# Patient Record
Sex: FEMALE | Race: WHITE | NOT HISPANIC OR LATINO | URBAN - METROPOLITAN AREA
[De-identification: names, ages, dates, MRNs, and addresses within clinical notes are randomized per-mention and may not be internally consistent; named-entity substitution may affect disease eponyms.]

---

## 2017-02-17 ENCOUNTER — OUTPATIENT (OUTPATIENT)
Dept: OUTPATIENT SERVICES | Facility: HOSPITAL | Age: 67
LOS: 1 days | Discharge: HOME | End: 2017-02-17

## 2017-02-17 DIAGNOSIS — C50.919 MALIGNANT NEOPLASM OF UNSPECIFIED SITE OF UNSPECIFIED FEMALE BREAST: ICD-10-CM

## 2017-02-20 ENCOUNTER — RESULT REVIEW (OUTPATIENT)
Age: 67
End: 2017-02-20

## 2017-02-21 ENCOUNTER — APPOINTMENT (OUTPATIENT)
Dept: HEMATOLOGY ONCOLOGY | Facility: CLINIC | Age: 67
End: 2017-02-21

## 2017-02-21 VITALS
DIASTOLIC BLOOD PRESSURE: 90 MMHG | HEART RATE: 99 BPM | BODY MASS INDEX: 25.06 KG/M2 | WEIGHT: 158.73 LBS | TEMPERATURE: 97.7 F | SYSTOLIC BLOOD PRESSURE: 150 MMHG | RESPIRATION RATE: 16 BRPM | OXYGEN SATURATION: 98 %

## 2017-02-21 DIAGNOSIS — F41.9 ANXIETY DISORDER, UNSPECIFIED: ICD-10-CM

## 2017-02-21 LAB
BASOPHILS # BLD AUTO: 0 K/UL — SIGNIFICANT CHANGE UP (ref 0–0.2)
BASOPHILS NFR BLD AUTO: 0.7 % — SIGNIFICANT CHANGE UP (ref 0–2)
EOSINOPHIL # BLD AUTO: 0.2 K/UL — SIGNIFICANT CHANGE UP (ref 0–0.5)
EOSINOPHIL NFR BLD AUTO: 3.1 % — SIGNIFICANT CHANGE UP (ref 0–6)
HCT VFR BLD CALC: 43.9 % — SIGNIFICANT CHANGE UP (ref 34.5–45)
HGB BLD-MCNC: 14.7 G/DL — SIGNIFICANT CHANGE UP (ref 11.5–15.5)
LYMPHOCYTES # BLD AUTO: 2 K/UL — SIGNIFICANT CHANGE UP (ref 1–3.3)
LYMPHOCYTES # BLD AUTO: 29.2 % — SIGNIFICANT CHANGE UP (ref 13–44)
MCHC RBC-ENTMCNC: 30.9 PG — SIGNIFICANT CHANGE UP (ref 27–34)
MCHC RBC-ENTMCNC: 33.4 G/DL — SIGNIFICANT CHANGE UP (ref 32–36)
MCV RBC AUTO: 92.7 FL — SIGNIFICANT CHANGE UP (ref 80–100)
MONOCYTES # BLD AUTO: 0.5 K/UL — SIGNIFICANT CHANGE UP (ref 0–0.9)
MONOCYTES NFR BLD AUTO: 7.4 % — SIGNIFICANT CHANGE UP (ref 2–14)
NEUTROPHILS # BLD AUTO: 4 K/UL — SIGNIFICANT CHANGE UP (ref 1.8–7.4)
NEUTROPHILS NFR BLD AUTO: 59.6 % — SIGNIFICANT CHANGE UP (ref 43–77)
PLATELET # BLD AUTO: 169 K/UL — SIGNIFICANT CHANGE UP (ref 150–400)
RBC # BLD: 4.74 M/UL — SIGNIFICANT CHANGE UP (ref 3.8–5.2)
RBC # FLD: 12.3 % — SIGNIFICANT CHANGE UP (ref 10.3–14.5)
WBC # BLD: 6.8 K/UL — SIGNIFICANT CHANGE UP (ref 3.8–10.5)
WBC # FLD AUTO: 6.8 K/UL — SIGNIFICANT CHANGE UP (ref 3.8–10.5)

## 2017-02-23 LAB — 25(OH)D3 SERPL-MCNC: 17.4 NG/ML

## 2017-04-03 ENCOUNTER — OTHER (OUTPATIENT)
Age: 67
End: 2017-04-03

## 2017-05-03 ENCOUNTER — RX RENEWAL (OUTPATIENT)
Age: 67
End: 2017-05-03

## 2017-05-08 ENCOUNTER — FORM ENCOUNTER (OUTPATIENT)
Age: 67
End: 2017-05-08

## 2017-05-09 ENCOUNTER — OUTPATIENT (OUTPATIENT)
Dept: OUTPATIENT SERVICES | Facility: HOSPITAL | Age: 67
LOS: 1 days | End: 2017-05-09
Payer: MEDICARE

## 2017-05-09 ENCOUNTER — APPOINTMENT (OUTPATIENT)
Dept: ULTRASOUND IMAGING | Facility: IMAGING CENTER | Age: 67
End: 2017-05-09

## 2017-05-09 ENCOUNTER — APPOINTMENT (OUTPATIENT)
Dept: MAMMOGRAPHY | Facility: IMAGING CENTER | Age: 67
End: 2017-05-09

## 2017-05-09 DIAGNOSIS — C50.919 MALIGNANT NEOPLASM OF UNSPECIFIED SITE OF UNSPECIFIED FEMALE BREAST: ICD-10-CM

## 2017-05-09 PROCEDURE — 76641 ULTRASOUND BREAST COMPLETE: CPT

## 2017-05-09 PROCEDURE — 77065 DX MAMMO INCL CAD UNI: CPT

## 2017-05-09 PROCEDURE — G0279: CPT

## 2017-05-12 ENCOUNTER — FORM ENCOUNTER (OUTPATIENT)
Age: 67
End: 2017-05-12

## 2017-05-13 ENCOUNTER — OUTPATIENT (OUTPATIENT)
Dept: OUTPATIENT SERVICES | Facility: HOSPITAL | Age: 67
LOS: 1 days | End: 2017-05-13
Payer: MEDICARE

## 2017-05-13 ENCOUNTER — RESULT REVIEW (OUTPATIENT)
Age: 67
End: 2017-05-13

## 2017-05-13 ENCOUNTER — APPOINTMENT (OUTPATIENT)
Dept: ULTRASOUND IMAGING | Facility: IMAGING CENTER | Age: 67
End: 2017-05-13

## 2017-05-13 DIAGNOSIS — Z00.8 ENCOUNTER FOR OTHER GENERAL EXAMINATION: ICD-10-CM

## 2017-05-13 PROCEDURE — 77065 DX MAMMO INCL CAD UNI: CPT

## 2017-05-13 PROCEDURE — 88305 TISSUE EXAM BY PATHOLOGIST: CPT

## 2017-05-13 PROCEDURE — 19083 BX BREAST 1ST LESION US IMAG: CPT

## 2017-05-15 DIAGNOSIS — N63 UNSPECIFIED LUMP IN BREAST: ICD-10-CM

## 2017-05-15 DIAGNOSIS — Z85.3 PERSONAL HISTORY OF MALIGNANT NEOPLASM OF BREAST: ICD-10-CM

## 2017-05-15 DIAGNOSIS — R92.1 MAMMOGRAPHIC CALCIFICATION FOUND ON DIAGNOSTIC IMAGING OF BREAST: ICD-10-CM

## 2017-08-23 ENCOUNTER — OUTPATIENT (OUTPATIENT)
Dept: OUTPATIENT SERVICES | Facility: HOSPITAL | Age: 67
LOS: 1 days | Discharge: HOME | End: 2017-08-23

## 2017-08-23 DIAGNOSIS — C50.919 MALIGNANT NEOPLASM OF UNSPECIFIED SITE OF UNSPECIFIED FEMALE BREAST: ICD-10-CM

## 2017-08-29 ENCOUNTER — APPOINTMENT (OUTPATIENT)
Dept: HEMATOLOGY ONCOLOGY | Facility: CLINIC | Age: 67
End: 2017-08-29
Payer: MEDICARE

## 2017-08-29 VITALS
OXYGEN SATURATION: 99 % | DIASTOLIC BLOOD PRESSURE: 97 MMHG | HEIGHT: 65.75 IN | HEART RATE: 95 BPM | TEMPERATURE: 98.6 F | WEIGHT: 163.14 LBS | RESPIRATION RATE: 16 BRPM | SYSTOLIC BLOOD PRESSURE: 152 MMHG | BODY MASS INDEX: 26.53 KG/M2

## 2017-08-29 PROCEDURE — 99214 OFFICE O/P EST MOD 30 MIN: CPT

## 2017-08-29 RX ORDER — CALCIPOTRIENE AND BETAMETHASONE DIPROPIONATE 50; .5 UG/G; MG/G
0.005-0.064 AEROSOL, FOAM TOPICAL
Qty: 60 | Refills: 0 | Status: DISCONTINUED | COMMUNITY
Start: 2017-02-16

## 2017-11-09 ENCOUNTER — RX RENEWAL (OUTPATIENT)
Age: 67
End: 2017-11-09

## 2017-12-28 ENCOUNTER — OTHER (OUTPATIENT)
Age: 67
End: 2017-12-28

## 2018-01-02 ENCOUNTER — APPOINTMENT (OUTPATIENT)
Dept: ULTRASOUND IMAGING | Facility: IMAGING CENTER | Age: 68
End: 2018-01-02

## 2018-01-24 ENCOUNTER — RX RENEWAL (OUTPATIENT)
Age: 68
End: 2018-01-24

## 2018-01-26 ENCOUNTER — APPOINTMENT (OUTPATIENT)
Dept: ULTRASOUND IMAGING | Facility: IMAGING CENTER | Age: 68
End: 2018-01-26
Payer: MEDICARE

## 2018-01-26 ENCOUNTER — OUTPATIENT (OUTPATIENT)
Dept: OUTPATIENT SERVICES | Facility: HOSPITAL | Age: 68
LOS: 1 days | End: 2018-01-26
Payer: MEDICARE

## 2018-01-26 DIAGNOSIS — C50.919 MALIGNANT NEOPLASM OF UNSPECIFIED SITE OF UNSPECIFIED FEMALE BREAST: ICD-10-CM

## 2018-01-26 PROCEDURE — 76642 ULTRASOUND BREAST LIMITED: CPT | Mod: 26,RT

## 2018-01-26 PROCEDURE — 76642 ULTRASOUND BREAST LIMITED: CPT

## 2018-02-14 ENCOUNTER — OUTPATIENT (OUTPATIENT)
Dept: OUTPATIENT SERVICES | Facility: HOSPITAL | Age: 68
LOS: 1 days | Discharge: HOME | End: 2018-02-14

## 2018-02-14 DIAGNOSIS — C50.919 MALIGNANT NEOPLASM OF UNSPECIFIED SITE OF UNSPECIFIED FEMALE BREAST: ICD-10-CM

## 2018-02-20 ENCOUNTER — APPOINTMENT (OUTPATIENT)
Dept: HEMATOLOGY ONCOLOGY | Facility: CLINIC | Age: 68
End: 2018-02-20
Payer: MEDICARE

## 2018-02-20 VITALS
SYSTOLIC BLOOD PRESSURE: 157 MMHG | HEART RATE: 110 BPM | DIASTOLIC BLOOD PRESSURE: 92 MMHG | RESPIRATION RATE: 16 BRPM | OXYGEN SATURATION: 98 % | BODY MASS INDEX: 26.96 KG/M2 | WEIGHT: 165.79 LBS | TEMPERATURE: 99.2 F

## 2018-02-20 DIAGNOSIS — M81.0 AGE-RELATED OSTEOPOROSIS W/OUT CURRENT PATHOLOGICAL FRACTURE: ICD-10-CM

## 2018-02-20 PROCEDURE — 99214 OFFICE O/P EST MOD 30 MIN: CPT

## 2018-02-20 RX ORDER — IXEKIZUMAB 80 MG/ML
80 INJECTION, SOLUTION SUBCUTANEOUS
Refills: 0 | Status: ACTIVE | COMMUNITY

## 2018-05-09 ENCOUNTER — RX RENEWAL (OUTPATIENT)
Age: 68
End: 2018-05-09

## 2018-05-30 ENCOUNTER — RX RENEWAL (OUTPATIENT)
Age: 68
End: 2018-05-30

## 2018-07-26 ENCOUNTER — OUTPATIENT (OUTPATIENT)
Dept: OUTPATIENT SERVICES | Facility: HOSPITAL | Age: 68
LOS: 1 days | Discharge: HOME | End: 2018-07-26

## 2018-07-26 DIAGNOSIS — C50.919 MALIGNANT NEOPLASM OF UNSPECIFIED SITE OF UNSPECIFIED FEMALE BREAST: ICD-10-CM

## 2018-08-03 ENCOUNTER — APPOINTMENT (OUTPATIENT)
Dept: HEMATOLOGY ONCOLOGY | Facility: CLINIC | Age: 68
End: 2018-08-03
Payer: MEDICARE

## 2018-08-03 VITALS
SYSTOLIC BLOOD PRESSURE: 149 MMHG | OXYGEN SATURATION: 99 % | HEART RATE: 98 BPM | RESPIRATION RATE: 16 BRPM | BODY MASS INDEX: 26.18 KG/M2 | TEMPERATURE: 98 F | WEIGHT: 161 LBS | DIASTOLIC BLOOD PRESSURE: 87 MMHG

## 2018-08-03 DIAGNOSIS — C50.919 MALIGNANT NEOPLASM OF UNSPECIFIED SITE OF UNSPECIFIED FEMALE BREAST: ICD-10-CM

## 2018-08-03 PROCEDURE — 99214 OFFICE O/P EST MOD 30 MIN: CPT

## 2018-10-04 ENCOUNTER — OTHER (OUTPATIENT)
Age: 68
End: 2018-10-04

## 2018-10-07 ENCOUNTER — FORM ENCOUNTER (OUTPATIENT)
Age: 68
End: 2018-10-07

## 2018-10-08 ENCOUNTER — APPOINTMENT (OUTPATIENT)
Dept: ULTRASOUND IMAGING | Facility: IMAGING CENTER | Age: 68
End: 2018-10-08
Payer: MEDICARE

## 2018-10-08 ENCOUNTER — OUTPATIENT (OUTPATIENT)
Dept: OUTPATIENT SERVICES | Facility: HOSPITAL | Age: 68
LOS: 1 days | End: 2018-10-08
Payer: MEDICARE

## 2018-10-08 ENCOUNTER — APPOINTMENT (OUTPATIENT)
Dept: MAMMOGRAPHY | Facility: IMAGING CENTER | Age: 68
End: 2018-10-08
Payer: MEDICARE

## 2018-10-08 DIAGNOSIS — C50.912 MALIGNANT NEOPLASM OF UNSPECIFIED SITE OF LEFT FEMALE BREAST: ICD-10-CM

## 2018-10-08 PROCEDURE — 77063 BREAST TOMOSYNTHESIS BI: CPT

## 2018-10-08 PROCEDURE — 77063 BREAST TOMOSYNTHESIS BI: CPT | Mod: 26,52

## 2018-10-08 PROCEDURE — 77067 SCR MAMMO BI INCL CAD: CPT

## 2018-10-08 PROCEDURE — 76641 ULTRASOUND BREAST COMPLETE: CPT | Mod: 26,RT

## 2018-10-08 PROCEDURE — 76641 ULTRASOUND BREAST COMPLETE: CPT

## 2018-10-08 PROCEDURE — 77067 SCR MAMMO BI INCL CAD: CPT | Mod: 26,RT,52

## 2018-11-28 ENCOUNTER — RX RENEWAL (OUTPATIENT)
Age: 68
End: 2018-11-28

## 2019-01-11 ENCOUNTER — OTHER (OUTPATIENT)
Age: 69
End: 2019-01-11

## 2019-02-01 ENCOUNTER — OUTPATIENT (OUTPATIENT)
Dept: OUTPATIENT SERVICES | Facility: HOSPITAL | Age: 69
LOS: 1 days | Discharge: HOME | End: 2019-02-01

## 2019-02-01 DIAGNOSIS — C50.919 MALIGNANT NEOPLASM OF UNSPECIFIED SITE OF UNSPECIFIED FEMALE BREAST: ICD-10-CM

## 2019-02-25 ENCOUNTER — FORM ENCOUNTER (OUTPATIENT)
Age: 69
End: 2019-02-25

## 2019-02-25 ENCOUNTER — OUTPATIENT (OUTPATIENT)
Dept: OUTPATIENT SERVICES | Facility: HOSPITAL | Age: 69
LOS: 1 days | Discharge: HOME | End: 2019-02-25

## 2019-02-25 DIAGNOSIS — C50.919 MALIGNANT NEOPLASM OF UNSPECIFIED SITE OF UNSPECIFIED FEMALE BREAST: ICD-10-CM

## 2019-02-26 ENCOUNTER — OTHER (OUTPATIENT)
Age: 69
End: 2019-02-26

## 2019-02-26 ENCOUNTER — OUTPATIENT (OUTPATIENT)
Dept: OUTPATIENT SERVICES | Facility: HOSPITAL | Age: 69
LOS: 1 days | End: 2019-02-26
Payer: MEDICARE

## 2019-02-26 ENCOUNTER — APPOINTMENT (OUTPATIENT)
Dept: MRI IMAGING | Facility: IMAGING CENTER | Age: 69
End: 2019-02-26
Payer: MEDICARE

## 2019-02-26 DIAGNOSIS — C50.912 MALIGNANT NEOPLASM OF UNSPECIFIED SITE OF LEFT FEMALE BREAST: ICD-10-CM

## 2019-02-26 PROCEDURE — 77049 MRI BREAST C-+ W/CAD BI: CPT | Mod: 26

## 2019-02-26 PROCEDURE — C8937: CPT

## 2019-02-26 PROCEDURE — A9585: CPT

## 2019-02-26 PROCEDURE — C8908: CPT

## 2019-03-05 ENCOUNTER — APPOINTMENT (OUTPATIENT)
Dept: HEMATOLOGY ONCOLOGY | Facility: CLINIC | Age: 69
End: 2019-03-05
Payer: MEDICARE

## 2019-03-05 VITALS
SYSTOLIC BLOOD PRESSURE: 161 MMHG | RESPIRATION RATE: 16 BRPM | WEIGHT: 169.51 LBS | TEMPERATURE: 98.2 F | OXYGEN SATURATION: 96 % | HEART RATE: 111 BPM | BODY MASS INDEX: 27.57 KG/M2 | DIASTOLIC BLOOD PRESSURE: 93 MMHG

## 2019-03-05 DIAGNOSIS — C50.912 MALIGNANT NEOPLASM OF UNSPECIFIED SITE OF LEFT FEMALE BREAST: ICD-10-CM

## 2019-03-05 PROCEDURE — 99214 OFFICE O/P EST MOD 30 MIN: CPT

## 2019-03-05 RX ORDER — TRIAMCINOLONE ACETONIDE 1 MG/G
0.1 CREAM TOPICAL
Qty: 80 | Refills: 0 | Status: DISCONTINUED | COMMUNITY
Start: 2017-01-26 | End: 2019-03-05

## 2019-03-07 NOTE — PHYSICAL EXAM
[Fully active, able to carry on all pre-disease performance without restriction] : Status 0 - Fully active, able to carry on all pre-disease performance without restriction [Normal] : affect appropriate [de-identified] : right w/o masses; left s/p MRM with reconstruction, no masses; B/L ax neg [de-identified] : elbow areas with psoriatic skin lesions ;  less than on prior exam plaque-like lesions with dried, raised firm center c/o psoriasis

## 2019-03-07 NOTE — HISTORY OF PRESENT ILLNESS
[Disease: _____________________] : Disease: [unfilled] [AJCC Stage: ____] : AJCC Stage: [unfilled] [de-identified] : H/O stage IIIA left breast ca s/p neoadjuvant DD AC-T followed by LMRM/AXLND/reconstruction for 2.8 cm residual poorly diff IDC and additional 0.5 and 0.6 cm foci, 0/3 SLN, ER+ SC+ Her 2 adeline neg. Then S/P XRT at [facility] [place] 7/09. Begun on anastrazole 7/09 with significant arthralgias, and switched to exemestane with difficulty with depth perception and then switched to tamoxifen 3/10 to present (with resolution of all side effects).  [de-identified] : Interval History: Patient here for a follow up visit.\par \par She notes a good appetite, stable weight and excellent performance status. 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  She is having difficulty with her psoriasis and ha schanged medications with more relief of sxs in the recent past. \par \par New PCP : Trent Good [phone]\par \par Dexa: 10/2016: WNL in spine and hip but osteoporosis wrist\par Mammo  5/17 with right breast findings with bx neg; ; sched'd for 9/18\par \par \par \par

## 2019-03-15 ENCOUNTER — FORM ENCOUNTER (OUTPATIENT)
Age: 69
End: 2019-03-15

## 2019-03-16 ENCOUNTER — OUTPATIENT (OUTPATIENT)
Dept: OUTPATIENT SERVICES | Facility: HOSPITAL | Age: 69
LOS: 1 days | End: 2019-03-16
Payer: MEDICARE

## 2019-03-16 ENCOUNTER — APPOINTMENT (OUTPATIENT)
Dept: RADIOLOGY | Facility: CLINIC | Age: 69
End: 2019-03-16

## 2019-03-16 DIAGNOSIS — C50.919 MALIGNANT NEOPLASM OF UNSPECIFIED SITE OF UNSPECIFIED FEMALE BREAST: ICD-10-CM

## 2019-03-16 DIAGNOSIS — M81.0 AGE-RELATED OSTEOPOROSIS WITHOUT CURRENT PATHOLOGICAL FRACTURE: ICD-10-CM

## 2019-03-16 PROCEDURE — 77080 DXA BONE DENSITY AXIAL: CPT | Mod: 26

## 2019-03-16 PROCEDURE — 77080 DXA BONE DENSITY AXIAL: CPT

## 2020-02-18 ENCOUNTER — OUTPATIENT (OUTPATIENT)
Dept: OUTPATIENT SERVICES | Facility: HOSPITAL | Age: 70
LOS: 1 days | End: 2020-02-18
Payer: MEDICARE

## 2020-02-18 ENCOUNTER — APPOINTMENT (OUTPATIENT)
Dept: ULTRASOUND IMAGING | Facility: CLINIC | Age: 70
End: 2020-02-18
Payer: MEDICARE

## 2020-02-18 ENCOUNTER — APPOINTMENT (OUTPATIENT)
Dept: MAMMOGRAPHY | Facility: CLINIC | Age: 70
End: 2020-02-18
Payer: MEDICARE

## 2020-02-18 DIAGNOSIS — Z12.31 ENCOUNTER FOR SCREENING MAMMOGRAM FOR MALIGNANT NEOPLASM OF BREAST: ICD-10-CM

## 2020-02-18 DIAGNOSIS — Z00.8 ENCOUNTER FOR OTHER GENERAL EXAMINATION: ICD-10-CM

## 2020-02-18 PROCEDURE — 77063 BREAST TOMOSYNTHESIS BI: CPT | Mod: 26,52

## 2020-02-18 PROCEDURE — 77067 SCR MAMMO BI INCL CAD: CPT

## 2020-02-18 PROCEDURE — 77067 SCR MAMMO BI INCL CAD: CPT | Mod: 26,RT,52

## 2020-02-18 PROCEDURE — 76641 ULTRASOUND BREAST COMPLETE: CPT | Mod: 26,RT

## 2020-02-18 PROCEDURE — 77063 BREAST TOMOSYNTHESIS BI: CPT

## 2020-02-18 PROCEDURE — 76641 ULTRASOUND BREAST COMPLETE: CPT

## 2021-03-03 ENCOUNTER — OUTPATIENT (OUTPATIENT)
Dept: OUTPATIENT SERVICES | Facility: HOSPITAL | Age: 71
LOS: 1 days | End: 2021-03-03
Payer: MEDICARE

## 2021-03-03 ENCOUNTER — APPOINTMENT (OUTPATIENT)
Dept: ULTRASOUND IMAGING | Facility: CLINIC | Age: 71
End: 2021-03-03
Payer: MEDICARE

## 2021-03-03 ENCOUNTER — APPOINTMENT (OUTPATIENT)
Dept: MAMMOGRAPHY | Facility: CLINIC | Age: 71
End: 2021-03-03
Payer: MEDICARE

## 2021-03-03 DIAGNOSIS — Z00.8 ENCOUNTER FOR OTHER GENERAL EXAMINATION: ICD-10-CM

## 2021-03-03 PROCEDURE — 77067 SCR MAMMO BI INCL CAD: CPT | Mod: 26,RT,52

## 2021-03-03 PROCEDURE — 77063 BREAST TOMOSYNTHESIS BI: CPT

## 2021-03-03 PROCEDURE — 76641 ULTRASOUND BREAST COMPLETE: CPT

## 2021-03-03 PROCEDURE — 77063 BREAST TOMOSYNTHESIS BI: CPT | Mod: 26,52

## 2021-03-03 PROCEDURE — 76641 ULTRASOUND BREAST COMPLETE: CPT | Mod: 26,RT

## 2021-03-03 PROCEDURE — 77067 SCR MAMMO BI INCL CAD: CPT

## 2021-03-29 ENCOUNTER — OUTPATIENT (OUTPATIENT)
Dept: OUTPATIENT SERVICES | Facility: HOSPITAL | Age: 71
LOS: 1 days | End: 2021-03-29
Payer: MEDICARE

## 2021-03-29 ENCOUNTER — APPOINTMENT (OUTPATIENT)
Dept: RADIOLOGY | Facility: CLINIC | Age: 71
End: 2021-03-29
Payer: MEDICARE

## 2021-03-29 DIAGNOSIS — Z00.00 ENCOUNTER FOR GENERAL ADULT MEDICAL EXAMINATION WITHOUT ABNORMAL FINDINGS: ICD-10-CM

## 2021-03-29 PROCEDURE — 77080 DXA BONE DENSITY AXIAL: CPT | Mod: 26

## 2021-03-29 PROCEDURE — 77080 DXA BONE DENSITY AXIAL: CPT

## 2022-03-04 ENCOUNTER — APPOINTMENT (OUTPATIENT)
Dept: MAMMOGRAPHY | Facility: CLINIC | Age: 72
End: 2022-03-04
Payer: MEDICARE

## 2022-03-04 ENCOUNTER — OUTPATIENT (OUTPATIENT)
Dept: OUTPATIENT SERVICES | Facility: HOSPITAL | Age: 72
LOS: 1 days | End: 2022-03-04
Payer: MEDICARE

## 2022-03-04 ENCOUNTER — APPOINTMENT (OUTPATIENT)
Dept: ULTRASOUND IMAGING | Facility: CLINIC | Age: 72
End: 2022-03-04
Payer: MEDICARE

## 2022-03-04 DIAGNOSIS — Z00.8 ENCOUNTER FOR OTHER GENERAL EXAMINATION: ICD-10-CM

## 2022-03-04 PROCEDURE — 77067 SCR MAMMO BI INCL CAD: CPT | Mod: 26,RT,52

## 2022-03-04 PROCEDURE — 77063 BREAST TOMOSYNTHESIS BI: CPT | Mod: 26,52

## 2022-03-04 PROCEDURE — 76641 ULTRASOUND BREAST COMPLETE: CPT | Mod: 26,RT

## 2022-03-04 PROCEDURE — 76641 ULTRASOUND BREAST COMPLETE: CPT

## 2022-03-04 PROCEDURE — 77063 BREAST TOMOSYNTHESIS BI: CPT

## 2022-03-04 PROCEDURE — 77067 SCR MAMMO BI INCL CAD: CPT

## 2022-04-04 ENCOUNTER — APPOINTMENT (OUTPATIENT)
Dept: ORTHOPEDIC SURGERY | Facility: CLINIC | Age: 72
End: 2022-04-04
Payer: MEDICARE

## 2022-04-04 VITALS — HEIGHT: 66 IN | WEIGHT: 150 LBS | BODY MASS INDEX: 24.11 KG/M2

## 2022-04-04 DIAGNOSIS — M18.11 UNILATERAL PRIMARY OSTEOARTHRITIS OF FIRST CARPOMETACARPAL JOINT, RIGHT HAND: ICD-10-CM

## 2022-04-04 PROCEDURE — 99213 OFFICE O/P EST LOW 20 MIN: CPT

## 2022-04-04 NOTE — HISTORY OF PRESENT ILLNESS
[6] : 6 [2] : 2 [Dull/Aching] : dull/aching [Sharp] : sharp [FreeTextEntry1] : left hand [de-identified] : none

## 2022-07-14 ENCOUNTER — APPOINTMENT (OUTPATIENT)
Dept: ORTHOPEDIC SURGERY | Facility: CLINIC | Age: 72
End: 2022-07-14

## 2022-07-14 VITALS — HEIGHT: 66 IN | BODY MASS INDEX: 24.11 KG/M2 | WEIGHT: 150 LBS

## 2022-07-14 DIAGNOSIS — R20.0 ANESTHESIA OF SKIN: ICD-10-CM

## 2022-07-14 DIAGNOSIS — L40.9 PSORIASIS, UNSPECIFIED: ICD-10-CM

## 2022-07-14 DIAGNOSIS — M18.11 UNILATERAL PRIMARY OSTEOARTHRITIS OF FIRST CARPOMETACARPAL JOINT, RIGHT HAND: ICD-10-CM

## 2022-07-14 PROCEDURE — 99213 OFFICE O/P EST LOW 20 MIN: CPT

## 2022-07-14 NOTE — PHYSICAL EXAM
[de-identified] : L hand \par Mild swelling\par Finger and thumb stiffness\par Mild tender thumb CMC \par \par

## 2022-07-14 NOTE — ASSESSMENT
[FreeTextEntry1] : Neuro consult- neuropathy \par EMG bilateral UE and LE\par Return after neruo consult

## 2022-07-14 NOTE — HISTORY OF PRESENT ILLNESS
[3] : 3 [2] : 2 [Dull/Aching] : dull/aching [de-identified] : L hand numbness, loss of dexterity  [FreeTextEntry1] : L hand [de-identified] : PT

## 2022-07-21 ENCOUNTER — APPOINTMENT (OUTPATIENT)
Dept: NEUROLOGY | Facility: CLINIC | Age: 72
End: 2022-07-21

## 2022-07-21 DIAGNOSIS — G60.9 HEREDITARY AND IDIOPATHIC NEUROPATHY, UNSPECIFIED: ICD-10-CM

## 2022-07-21 DIAGNOSIS — R20.8 OTHER DISTURBANCES OF SKIN SENSATION: ICD-10-CM

## 2022-07-21 DIAGNOSIS — R20.2 ANESTHESIA OF SKIN: ICD-10-CM

## 2022-07-21 DIAGNOSIS — R20.0 ANESTHESIA OF SKIN: ICD-10-CM

## 2022-07-21 PROCEDURE — 95911 NRV CNDJ TEST 9-10 STUDIES: CPT

## 2022-07-21 PROCEDURE — 95886 MUSC TEST DONE W/N TEST COMP: CPT

## 2022-07-28 ENCOUNTER — APPOINTMENT (OUTPATIENT)
Dept: NEUROLOGY | Facility: CLINIC | Age: 72
End: 2022-07-28

## 2022-07-28 DIAGNOSIS — M54.12 RADICULOPATHY, CERVICAL REGION: ICD-10-CM

## 2022-07-28 DIAGNOSIS — G56.01 CARPAL TUNNEL SYNDROME, RIGHT UPPER LIMB: ICD-10-CM

## 2022-07-28 DIAGNOSIS — R20.2 ANESTHESIA OF SKIN: ICD-10-CM

## 2022-07-28 DIAGNOSIS — R20.0 ANESTHESIA OF SKIN: ICD-10-CM

## 2022-07-28 PROCEDURE — 95912 NRV CNDJ TEST 11-12 STUDIES: CPT

## 2022-07-28 PROCEDURE — 95886 MUSC TEST DONE W/N TEST COMP: CPT

## 2022-08-16 ENCOUNTER — APPOINTMENT (OUTPATIENT)
Dept: ORTHOPEDIC SURGERY | Facility: CLINIC | Age: 72
End: 2022-08-16

## 2022-08-16 VITALS — HEIGHT: 66 IN | WEIGHT: 150 LBS | BODY MASS INDEX: 24.11 KG/M2

## 2022-08-16 DIAGNOSIS — G62.9 POLYNEUROPATHY, UNSPECIFIED: ICD-10-CM

## 2022-08-16 DIAGNOSIS — M18.12 UNILATERAL PRIMARY OSTEOARTHRITIS OF FIRST CARPOMETACARPAL JOINT, LEFT HAND: ICD-10-CM

## 2022-08-16 PROCEDURE — 99213 OFFICE O/P EST LOW 20 MIN: CPT

## 2022-08-17 PROBLEM — M18.12 PRIMARY OSTEOARTHRITIS OF FIRST CARPOMETACARPAL JOINT OF LEFT HAND: Status: ACTIVE | Noted: 2022-04-04

## 2022-08-17 PROBLEM — G62.9 PERIPHERAL POLYNEUROPATHY: Status: ACTIVE | Noted: 2022-07-14

## 2022-08-17 NOTE — PHYSICAL EXAM
[de-identified] : L hand \par Mild swelling\par Finger and thumb stiffness\par Mild tender thumb CMC \par \par

## 2022-08-17 NOTE — HISTORY OF PRESENT ILLNESS
[4] : 4 [3] : 3 [Dull/Aching] : dull/aching [de-identified] : EMG neg for L CTS; could not do cerivcal due to lymph edema [FreeTextEntry1] : L hand [de-identified] : ice,advil

## 2022-10-10 ENCOUNTER — APPOINTMENT (OUTPATIENT)
Dept: ORTHOPEDIC SURGERY | Facility: CLINIC | Age: 72
End: 2022-10-10

## 2022-10-10 VITALS — WEIGHT: 150 LBS | BODY MASS INDEX: 24.11 KG/M2 | HEIGHT: 66 IN

## 2022-10-10 DIAGNOSIS — M77.8 OTHER ENTHESOPATHIES, NOT ELSEWHERE CLASSIFIED: ICD-10-CM

## 2022-10-10 PROCEDURE — J3490M: CUSTOM

## 2022-10-10 PROCEDURE — 20600 DRAIN/INJ JOINT/BURSA W/O US: CPT

## 2022-10-10 PROCEDURE — 99213 OFFICE O/P EST LOW 20 MIN: CPT | Mod: 25

## 2022-10-11 PROBLEM — M77.8 HAND TENDONITIS: Status: ACTIVE | Noted: 2022-04-04

## 2022-10-11 NOTE — HISTORY OF PRESENT ILLNESS
[3] : 3 [2] : 2 [Dull/Aching] : dull/aching [de-identified] : L 5th MCP pain and swelling\par \par  [FreeTextEntry1] : Left hand [FreeTextEntry5] : pain is the same\par  [de-identified] : No

## 2023-04-11 ENCOUNTER — APPOINTMENT (OUTPATIENT)
Dept: MAMMOGRAPHY | Facility: CLINIC | Age: 73
End: 2023-04-11
Payer: MEDICARE

## 2023-04-11 ENCOUNTER — APPOINTMENT (OUTPATIENT)
Dept: ULTRASOUND IMAGING | Facility: CLINIC | Age: 73
End: 2023-04-11
Payer: MEDICARE

## 2023-04-11 ENCOUNTER — OUTPATIENT (OUTPATIENT)
Dept: OUTPATIENT SERVICES | Facility: HOSPITAL | Age: 73
LOS: 1 days | End: 2023-04-11
Payer: MEDICARE

## 2023-04-11 DIAGNOSIS — Z00.8 ENCOUNTER FOR OTHER GENERAL EXAMINATION: ICD-10-CM

## 2023-04-11 PROCEDURE — 77063 BREAST TOMOSYNTHESIS BI: CPT | Mod: 26,52

## 2023-04-11 PROCEDURE — 77063 BREAST TOMOSYNTHESIS BI: CPT

## 2023-04-11 PROCEDURE — 77067 SCR MAMMO BI INCL CAD: CPT

## 2023-04-11 PROCEDURE — 77067 SCR MAMMO BI INCL CAD: CPT | Mod: 26,RT,52

## 2023-04-11 PROCEDURE — 76641 ULTRASOUND BREAST COMPLETE: CPT | Mod: 26,RT,GA

## 2023-04-11 PROCEDURE — 76641 ULTRASOUND BREAST COMPLETE: CPT

## 2023-05-02 ENCOUNTER — OFFICE (OUTPATIENT)
Dept: URBAN - METROPOLITAN AREA CLINIC 70 | Facility: CLINIC | Age: 73
Setting detail: OPHTHALMOLOGY
End: 2023-05-02
Payer: MEDICARE

## 2023-05-02 DIAGNOSIS — H26.493: ICD-10-CM

## 2023-05-02 DIAGNOSIS — B88.0: ICD-10-CM

## 2023-05-02 DIAGNOSIS — H01.001: ICD-10-CM

## 2023-05-02 DIAGNOSIS — H01.004: ICD-10-CM

## 2023-05-02 DIAGNOSIS — H02.89: ICD-10-CM

## 2023-05-02 PROCEDURE — 99213 OFFICE O/P EST LOW 20 MIN: CPT | Performed by: OPHTHALMOLOGY

## 2023-05-02 ASSESSMENT — REFRACTION_AUTOREFRACTION
OS_CYLINDER: -1.25
OS_SPHERE: +0.75
OD_AXIS: 089
OD_CYLINDER: -1.25
OD_SPHERE: +1.00
OS_AXIS: 72

## 2023-05-02 ASSESSMENT — AXIALLENGTH_DERIVED
OS_AL: 22.9201
OD_AL: 22.7856

## 2023-05-02 ASSESSMENT — SPHEQUIV_DERIVED
OD_SPHEQUIV: 0.375
OS_SPHEQUIV: 0.125

## 2023-05-02 ASSESSMENT — LID EXAM ASSESSMENTS
OD_BLEPHARITIS: 2+
OS_BLEPHARITIS: 2+

## 2023-05-02 ASSESSMENT — REFRACTION_MANIFEST
OD_SPHERE: PLANO
OS_VA2: 20/20
OS_ADD: +2.75
OS_SPHERE: PLANO
OD_ADD: +2.25
OD_VA2: 20/20

## 2023-05-02 ASSESSMENT — VISUAL ACUITY
OD_BCVA: 20/30
OS_BCVA: 20/30

## 2023-05-02 ASSESSMENT — CONFRONTATIONAL VISUAL FIELD TEST (CVF)
OD_FINDINGS: FULL
OS_FINDINGS: FULL

## 2023-05-02 ASSESSMENT — KERATOMETRY
OD_K2POWER_DIOPTERS: 45.75
OD_AXISANGLE_DEGREES: 009
OS_K2POWER_DIOPTERS: 45.50
OD_K1POWER_DIOPTERS: 45.00
OS_K1POWER_DIOPTERS: 45.00
OS_AXISANGLE_DEGREES: 143

## 2023-05-03 ENCOUNTER — APPOINTMENT (OUTPATIENT)
Dept: ORTHOPEDIC SURGERY | Facility: CLINIC | Age: 73
End: 2023-05-03
Payer: MEDICARE

## 2023-05-03 VITALS — BODY MASS INDEX: 24.11 KG/M2 | HEIGHT: 66 IN | WEIGHT: 150 LBS

## 2023-05-03 PROCEDURE — 99214 OFFICE O/P EST MOD 30 MIN: CPT | Mod: 25

## 2023-05-03 PROCEDURE — 20611 DRAIN/INJ JOINT/BURSA W/US: CPT | Mod: LT

## 2023-05-03 NOTE — HISTORY OF PRESENT ILLNESS
[Sudden] : sudden [8] : 8 [6] : 6 [Dull/Aching] : dull/aching [Radiating] : radiating [Constant] : constant [Leisure] : leisure [Sleep] : sleep [de-identified] : 5/3/23: 71 yo fm presents with L shoulder pain sine about Feb2023. Denies specific injury. She went to PCP who prescribed PT for a shoulder sprain. She states she went to 3 sessions of PT and pain significantly worsened. Her PCP ordered x rays and MRI. She tried advil with no relief.\par \par MRI L shoulder:\par 1.  Moderate, 0.5 x 1.6 cm high-grade articular surface partial-thickness tear of the distal supraspinatus tendon at it insertion. Background moderate supraspinatus tendinosis. Mild to moderate infraspinatus and subscapularis tendinosis. Small subacromial subdeltoid bursitis.\par 2.  Tear of the superior labrum and degenerative tear of the posterosuperior to posteroinferior labrum. \par 3.  Small joint effusion.\par 4.  Intact long head of the biceps tendon. \par 5.  Type I AC injury as described [] : Post Surgical Visit: no [FreeTextEntry1] : Let shoulder [FreeTextEntry3] : March 2023 [FreeTextEntry5] : No known injury. [FreeTextEntry6] : numbness & tingling in the hand at times [FreeTextEntry7] : humerus/left arm [de-identified] : PCP

## 2023-05-03 NOTE — PHYSICAL EXAM
[Left] : left shoulder [5 ___] : forward flexion 5[unfilled]/5 [4___] : external rotation 4[unfilled]/5 [5___] : internal rotation 5[unfilled]/5 [] : positive Carrillo [FreeTextEntry9] : \par ER 30

## 2023-05-03 NOTE — ASSESSMENT
[FreeTextEntry1] : L PRCT\par MRI and xrays reviewed.\par MRI shows PRCT, labral tearing, bursitis.\par Discussed op versus non op tx, including the r/b/a/c of both.\par Discussed rehab and recovery after SA, SAD, acromioplasty, synovectomy, GHD, possible RCR.\par L SA injection given.\par Resume PT.\par RTO 6 weeks.\par \par Procedure Note:\par Large Joint Injection was performed because of pain and inflammation, failure of conservative treatment.  \par Medications:\par Depo-Medrol: 1 cc, 80 mg.\par Lidocaine: 2 cc, 1%. \par Marcaine: 2 cc, .25%. \par \par Medication was injected in the left subacromial space. Patient has tried OTC's including aspirin, Ibuprofen, Aleve etc or prescription NSAIDs, and/or exercises at home and/ or physical therapy without satisfactory response. The risks, benefits, and alternatives to cortisone injection were explained in full to the patient. Risks outlined include but are not limited to infection, sepsis, bleeding, scarring, skin discoloration, temporary increase in pain, syncopal episode, failure to resolve symptoms, allergic reaction, symptom recurrence, and elevation of blood sugar in diabetics. Patient understood the risks. All questions were answered. After discussion of options, patient requested an injection. Oral informed consent was obtained and sterile prep of the injection site was performed using alcohol. Sterile technique was utilized for the procedure including the preparation of the solutions used for the injection. Ethyl chloride spray was used topically.  Sterile technique used. Patient tolerated procedure well. Post Procedure Instructions: Patient was advised to call if redness, pain, or fever occur and apply ice for 15 min. out of every hour for the next 12-24 hours as tolerated. patient was advised to rest the joint(s) for 2 days.\par \par Ultrasound Guidance was used for the following reasons: for prior failure or difficult injection and to visualize tearing and inflammation.\par Ultrasound guided injection was performed of the shoulder, visualization of the needle and placement of injection was performed without complication.\par \par \par

## 2023-05-03 NOTE — DATA REVIEWED
[MRI] : MRI [Outside X-rays] : outside x-rays [Left] : left [Shoulder] : shoulder [I independently reviewed and interpreted images and report] : I independently reviewed and interpreted images and report [FreeTextEntry1] : PRCT, labral tearing, bursitis

## 2023-06-14 ENCOUNTER — APPOINTMENT (OUTPATIENT)
Dept: ORTHOPEDIC SURGERY | Facility: CLINIC | Age: 73
End: 2023-06-14
Payer: MEDICARE

## 2023-06-14 VITALS — BODY MASS INDEX: 24.11 KG/M2 | WEIGHT: 150 LBS | HEIGHT: 66 IN

## 2023-06-14 DIAGNOSIS — M75.52 BURSITIS OF LEFT SHOULDER: ICD-10-CM

## 2023-06-14 DIAGNOSIS — M75.112 INCOMPLETE ROTATOR CUFF TEAR OR RUPTURE OF LEFT SHOULDER, NOT SPECIFIED AS TRAUMATIC: ICD-10-CM

## 2023-06-14 PROCEDURE — 99213 OFFICE O/P EST LOW 20 MIN: CPT

## 2023-06-14 NOTE — ASSESSMENT
[FreeTextEntry1] : L PRCT\par MRI and xrays reviewed.\par MRI shows PRCT, labral tearing, bursitis.\par Discussed op versus non op tx, including the r/b/a/c of both.\par Discussed rehab and recovery after SA, SAD, acromioplasty, synovectomy, GHD, possible RCR.\par L SA injection given 5/3/23, great relief.\par Continue PT.\par HEP.\par RTO prn.\par

## 2023-06-14 NOTE — HISTORY OF PRESENT ILLNESS
[3] : 3 [1] : 2 [Dull/Aching] : dull/aching [de-identified] : 6/14/23: Here for follow up. She states injection last visit gave great relief. There is less pain. She is in PT which also helps.\par \par 5/3/23: 71 yo fm presents with L shoulder pain sine about Feb 2023. Denies specific injury. She went to PCP who prescribed PT for a shoulder sprain. She states she went to 3 sessions of PT and pain significantly worsened. Her PCP ordered x rays and MRI. She tried advil with no relief.\par \par MRI L shoulder:\par 1.  Moderate, 0.5 x 1.6 cm high-grade articular surface partial-thickness tear of the distal supraspinatus tendon at it insertion. Background moderate supraspinatus tendinosis. Mild to moderate infraspinatus and subscapularis tendinosis. Small subacromial subdeltoid bursitis.\par 2.  Tear of the superior labrum and degenerative tear of the posterosuperior to posteroinferior labrum. \par 3.  Small joint effusion.\par 4.  Intact long head of the biceps tendon. \par 5.  Type I AC injury as described [] : Post Surgical Visit: no [FreeTextEntry1] : left shoulder  [de-identified] : pt

## 2023-06-14 NOTE — PHYSICAL EXAM
[Left] : left shoulder [5 ___] : forward flexion 5[unfilled]/5 [5___] : external rotation 5[unfilled]/5 [] : negative impingement testing [FreeTextEntry9] : \par ER 40

## 2023-11-02 ENCOUNTER — OFFICE (OUTPATIENT)
Dept: URBAN - METROPOLITAN AREA CLINIC 70 | Facility: CLINIC | Age: 73
Setting detail: OPHTHALMOLOGY
End: 2023-11-02
Payer: MEDICARE

## 2023-11-02 DIAGNOSIS — H01.001: ICD-10-CM

## 2023-11-02 DIAGNOSIS — H02.89: ICD-10-CM

## 2023-11-02 DIAGNOSIS — H26.493: ICD-10-CM

## 2023-11-02 DIAGNOSIS — B88.0: ICD-10-CM

## 2023-11-02 DIAGNOSIS — H01.004: ICD-10-CM

## 2023-11-02 PROCEDURE — 99213 OFFICE O/P EST LOW 20 MIN: CPT | Performed by: OPHTHALMOLOGY

## 2023-11-02 ASSESSMENT — REFRACTION_MANIFEST
OS_SPHERE: PLANO
OS_VA2: 20/20
OD_VA2: 20/20
OD_ADD: +2.25
OS_ADD: +2.75
OD_SPHERE: PLANO

## 2023-11-02 ASSESSMENT — SPHEQUIV_DERIVED
OS_SPHEQUIV: 0.25
OD_SPHEQUIV: 0.375

## 2023-11-02 ASSESSMENT — REFRACTION_AUTOREFRACTION
OS_SPHERE: +0.75
OD_CYLINDER: -1.25
OD_AXIS: 093
OD_SPHERE: +1.00
OS_AXIS: 076
OS_CYLINDER: -1.00

## 2023-11-02 ASSESSMENT — CONFRONTATIONAL VISUAL FIELD TEST (CVF)
OD_FINDINGS: FULL
OS_FINDINGS: FULL

## 2023-11-02 ASSESSMENT — LID EXAM ASSESSMENTS
OS_BLEPHARITIS: 2+
OD_BLEPHARITIS: 2+

## 2024-04-15 ENCOUNTER — APPOINTMENT (OUTPATIENT)
Dept: ULTRASOUND IMAGING | Facility: CLINIC | Age: 74
End: 2024-04-15

## 2024-04-15 ENCOUNTER — APPOINTMENT (OUTPATIENT)
Dept: MAMMOGRAPHY | Facility: CLINIC | Age: 74
End: 2024-04-15

## 2024-04-15 ENCOUNTER — OUTPATIENT (OUTPATIENT)
Dept: OUTPATIENT SERVICES | Facility: HOSPITAL | Age: 74
LOS: 1 days | End: 2024-04-15
Payer: MEDICARE

## 2024-04-15 DIAGNOSIS — Z00.8 ENCOUNTER FOR OTHER GENERAL EXAMINATION: ICD-10-CM

## 2024-04-15 PROCEDURE — 76641 ULTRASOUND BREAST COMPLETE: CPT

## 2024-04-15 PROCEDURE — 77063 BREAST TOMOSYNTHESIS BI: CPT | Mod: 26,52

## 2024-04-15 PROCEDURE — 77067 SCR MAMMO BI INCL CAD: CPT

## 2024-04-15 PROCEDURE — 77067 SCR MAMMO BI INCL CAD: CPT | Mod: 26,RT,52

## 2024-04-15 PROCEDURE — 76641 ULTRASOUND BREAST COMPLETE: CPT | Mod: 26,50,GY

## 2024-04-15 PROCEDURE — 77063 BREAST TOMOSYNTHESIS BI: CPT

## 2025-07-03 ENCOUNTER — APPOINTMENT (OUTPATIENT)
Dept: ULTRASOUND IMAGING | Facility: CLINIC | Age: 75
End: 2025-07-03
Payer: MEDICARE

## 2025-07-03 ENCOUNTER — OUTPATIENT (OUTPATIENT)
Dept: OUTPATIENT SERVICES | Facility: HOSPITAL | Age: 75
LOS: 1 days | End: 2025-07-03
Payer: MEDICARE

## 2025-07-03 ENCOUNTER — APPOINTMENT (OUTPATIENT)
Dept: MAMMOGRAPHY | Facility: CLINIC | Age: 75
End: 2025-07-03
Payer: MEDICARE

## 2025-07-03 DIAGNOSIS — Z00.8 ENCOUNTER FOR OTHER GENERAL EXAMINATION: ICD-10-CM

## 2025-07-03 DIAGNOSIS — Z12.31 ENCOUNTER FOR SCREENING MAMMOGRAM FOR MALIGNANT NEOPLASM OF BREAST: ICD-10-CM

## 2025-07-03 PROCEDURE — 77067 SCR MAMMO BI INCL CAD: CPT | Mod: 26,RT,52

## 2025-07-03 PROCEDURE — 77063 BREAST TOMOSYNTHESIS BI: CPT | Mod: 26,52

## 2025-07-03 PROCEDURE — 77063 BREAST TOMOSYNTHESIS BI: CPT

## 2025-07-03 PROCEDURE — 76641 ULTRASOUND BREAST COMPLETE: CPT | Mod: 26,50,GA

## 2025-07-03 PROCEDURE — 77067 SCR MAMMO BI INCL CAD: CPT

## 2025-07-03 PROCEDURE — 76641 ULTRASOUND BREAST COMPLETE: CPT

## 2025-07-08 ENCOUNTER — OFFICE (OUTPATIENT)
Dept: URBAN - METROPOLITAN AREA CLINIC 70 | Facility: CLINIC | Age: 75
Setting detail: OPHTHALMOLOGY
End: 2025-07-08
Payer: MEDICARE

## 2025-07-08 DIAGNOSIS — H26.493: ICD-10-CM

## 2025-07-08 DIAGNOSIS — H02.89: ICD-10-CM

## 2025-07-08 DIAGNOSIS — H01.001: ICD-10-CM

## 2025-07-08 DIAGNOSIS — H01.004: ICD-10-CM

## 2025-07-08 PROCEDURE — 92014 COMPRE OPH EXAM EST PT 1/>: CPT | Performed by: OPHTHALMOLOGY

## 2025-07-08 ASSESSMENT — LID EXAM ASSESSMENTS
OD_BLEPHARITIS: 2+
OS_BLEPHARITIS: 2+

## 2025-07-08 ASSESSMENT — VISUAL ACUITY
OS_BCVA: 20/25-
OD_BCVA: 20/25-2

## 2025-07-08 ASSESSMENT — REFRACTION_MANIFEST
OD_ADD: +2.25
OD_SPHERE: PLANO
OS_ADD: +2.75
OS_VA2: 20/20
OD_VA2: 20/20
OS_SPHERE: PLANO

## 2025-07-08 ASSESSMENT — CONFRONTATIONAL VISUAL FIELD TEST (CVF)
OD_FINDINGS: FULL
OS_FINDINGS: FULL

## 2025-07-08 ASSESSMENT — REFRACTION_AUTOREFRACTION
OS_AXIS: 084
OD_SPHERE: +1.25
OD_CYLINDER: -1.50
OS_CYLINDER: -1.00
OS_SPHERE: +1.00
OD_AXIS: 095

## 2025-07-08 ASSESSMENT — KERATOMETRY
OD_K1POWER_DIOPTERS: 45.00
OD_AXISANGLE_DEGREES: 015
OD_K2POWER_DIOPTERS: 45.75
OS_AXISANGLE_DEGREES: 090
OS_K2POWER_DIOPTERS: 45.25
OS_K1POWER_DIOPTERS: 45.25

## 2025-09-08 ENCOUNTER — APPOINTMENT (OUTPATIENT)
Dept: ORTHOPEDIC SURGERY | Facility: CLINIC | Age: 75
End: 2025-09-08
Payer: MEDICARE

## 2025-09-08 VITALS — HEIGHT: 66 IN | WEIGHT: 140 LBS | BODY MASS INDEX: 22.5 KG/M2

## 2025-09-08 DIAGNOSIS — Z78.9 OTHER SPECIFIED HEALTH STATUS: ICD-10-CM

## 2025-09-08 DIAGNOSIS — M65.931 UNSPECIFIED SYNOVITIS AND TENOSYNOVITIS, RIGHT FOREARM: ICD-10-CM

## 2025-09-08 PROCEDURE — 73110 X-RAY EXAM OF WRIST: CPT | Mod: RT

## 2025-09-08 PROCEDURE — J3490M: CUSTOM | Mod: NC

## 2025-09-08 PROCEDURE — 20550 NJX 1 TENDON SHEATH/LIGAMENT: CPT | Mod: RT

## 2025-09-08 PROCEDURE — 99213 OFFICE O/P EST LOW 20 MIN: CPT | Mod: 25
